# Patient Record
Sex: MALE | Race: WHITE | NOT HISPANIC OR LATINO | URBAN - METROPOLITAN AREA
[De-identification: names, ages, dates, MRNs, and addresses within clinical notes are randomized per-mention and may not be internally consistent; named-entity substitution may affect disease eponyms.]

---

## 2024-02-09 ENCOUNTER — TELEPHONE (OUTPATIENT)
Dept: PRIMARY CARE | Facility: CLINIC | Age: 24
End: 2024-02-09

## 2024-02-09 NOTE — TELEPHONE ENCOUNTER
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 Mother wants Dr Gutierrez \" to put his brain back in him\".    Mother continued to demand appt with Dr Gutierrez to discuss the gender issue. Advised mother that the patient is an adult who can certainly have an appointment with Dr Gutierrez.     Advised mother this rep cannot, by law, speak to  her about the patient.  Mother continued to demand appointment, make accusations, and talk about her own medical issues. Pt would not stop or let this rep speak, so this rep ended the phone call after a significant amount of time.       "

## 2024-02-23 NOTE — TELEPHONE ENCOUNTER
Patient is 23 years old he no longer comes to this office. Did not contact mother as she request patient is an adult